# Patient Record
Sex: FEMALE | Race: WHITE | NOT HISPANIC OR LATINO | Employment: FULL TIME | URBAN - METROPOLITAN AREA
[De-identification: names, ages, dates, MRNs, and addresses within clinical notes are randomized per-mention and may not be internally consistent; named-entity substitution may affect disease eponyms.]

---

## 2021-12-02 ENCOUNTER — MEDICAL CORRESPONDENCE (OUTPATIENT)
Dept: HEALTH INFORMATION MANAGEMENT | Facility: CLINIC | Age: 56
End: 2021-12-02
Payer: COMMERCIAL

## 2021-12-02 ENCOUNTER — TRANSCRIBE ORDERS (OUTPATIENT)
Dept: OTHER | Age: 56
End: 2021-12-02

## 2021-12-02 ENCOUNTER — TRANSFERRED RECORDS (OUTPATIENT)
Dept: HEALTH INFORMATION MANAGEMENT | Facility: CLINIC | Age: 56
End: 2021-12-02
Payer: COMMERCIAL

## 2021-12-02 DIAGNOSIS — H02.831 DERMATOCHALASIS OF RIGHT UPPER EYELID: Primary | ICD-10-CM

## 2022-01-26 ENCOUNTER — TELEPHONE (OUTPATIENT)
Dept: OPHTHALMOLOGY | Facility: CLINIC | Age: 57
End: 2022-01-26

## 2022-01-26 ENCOUNTER — OFFICE VISIT (OUTPATIENT)
Dept: OPHTHALMOLOGY | Facility: CLINIC | Age: 57
End: 2022-01-26
Attending: OPHTHALMOLOGY
Payer: COMMERCIAL

## 2022-01-26 DIAGNOSIS — H02.831 DERMATOCHALASIS OF BOTH UPPER EYELIDS: Primary | ICD-10-CM

## 2022-01-26 DIAGNOSIS — H02.834 DERMATOCHALASIS OF BOTH UPPER EYELIDS: Primary | ICD-10-CM

## 2022-01-26 PROCEDURE — 99204 OFFICE O/P NEW MOD 45 MIN: CPT | Mod: GC | Performed by: OPHTHALMOLOGY

## 2022-01-26 RX ORDER — PREDNISONE 20 MG/1
20 TABLET ORAL PRN
COMMUNITY
Start: 2022-01-05 | End: 2023-06-07

## 2022-01-26 ASSESSMENT — MARGIN REFLEX DISTANCE
OD_MRD1: 2.5
OS_MRD1: 2.5

## 2022-01-26 ASSESSMENT — VISUAL ACUITY
OS_CC+: -2
OS_CC: 20/15
OD_CC: 20/15
OD_CC+: -2
CORRECTION_TYPE: GLASSES
METHOD: SNELLEN - LINEAR

## 2022-01-26 ASSESSMENT — TONOMETRY
IOP_METHOD: ICARE
OS_IOP_MMHG: 12
OD_IOP_MMHG: 12

## 2022-01-26 ASSESSMENT — EXTERNAL EXAM - LEFT EYE: OS_EXAM: NORMAL

## 2022-01-26 ASSESSMENT — CONF VISUAL FIELD: COMMENTS: TANGENT VISUAL FIELD PERFORMED TODAY.

## 2022-01-26 ASSESSMENT — EXTERNAL EXAM - RIGHT EYE: OD_EXAM: NORMAL

## 2022-01-26 NOTE — TELEPHONE ENCOUNTER
[facility] Call Center    Phone Message    May a detailed message be left on voicemail: yes     Reason for Call: Other: pt wondering how much she is to take of her prescription, please advise with her     Action Taken: Message routed to:  Adult Clinics: Eye p [id number]    Travel Screening: Not Applicable

## 2022-01-26 NOTE — NURSING NOTE
Chief Complaints and History of Present Illnesses   Patient presents with     Droopy Eye Lid Evaluation       Chief Complaint(s) and History of Present Illness(es)     Droopy Eye Lid Evaluation     Laterality: right upper lid and left upper lid              Comments     Patient referred by Dr. Hays for dermatochalasis consultation. Pt notes lids are dropping and in the way of her vision. Lids will swell off and on and she feels like this has stretched her lids out and made them sag down. Unsure why lids droop, has seen an allergist and homeopathic doctors but has no answers at this point.                 Hermelinda Burris, COA

## 2022-01-26 NOTE — TELEPHONE ENCOUNTER
Checked with Krish Worrell, resident who worked with patient today, verified that there was no medication prescribed by us today and any discussion regarding Predisone dose should be done with whoever prescribed it originally. Relayed message to patient, she will contact her primary care provider.     Hermelinda Burris, COA, 9:46 AM 01/26/2022

## 2022-01-26 NOTE — LETTER
" 2022         RE:  :  MRN: Keyana Galss  1965  [id number]     Dear Dr. Hays,    Thank you for asking me to see your patient, Keyana Glass, for an oculoplastic   consultation.  My assessment and plan are below.  For further details, please see my attached clinic note.           HPI:     Chief Complaint(s) and History of Present Illness(es)     Droopy Eye Lid Evaluation     Laterality: right upper lid and left upper lid         Comments     Patient referred by Dr. Hays for dermatochalasis consultation. Pt   notes lids are dropping and in the way of her vision. Lids will swell off   and on and she feels like this has stretched her lids out and made them   sag down. Unsure why lids droop, has seen an allergist and homeopathic   doctors but has no answers at this point.          Keyana Glass is a 56 year old female who has noted extra skin over both eyelids since she started having repeated episodes of bilateral eyelid swelling starting around 1-2 years ago. She has had intermittent swelling of eyelids for this time and has seen dermatology and allergy doctors to try and discover the etiology of this, states no direct cause has been identified. When her eyelids swell, they stay swollen until she takes oral prednisone for 2 days which resolves the swelling. There is significant itching in the beginning when it will come on. No other treatment has been helpful. She has a small amount of itching initially when the swelling starts. No pain \"unless they are overly swollen with fluid.\" No other sinus/throat/face symptoms. No vision changes but the eyelids feel heavy.     Since this issue started, she feels that the swelling has caused \"loose, saggy excess skin\" above the eyelids that didn't used to be there.      The patient denies double vision. The lid droopy is interfering with activities of daily living.     Last flare was two days ago and she took 40 mg of prednisone, 20 mg yesterday and 10 mg " today.     No makeup. Has tattoo eyeliner. Only topical is Vanicream. Has tried Protopic and steroid antibiotic creams and she feels that made it worse.     EXAM:     MRD1: 2.5/2.5  PFH: 9/9  Dermatochalasis with excess skin, no active lid edema.   Aponeurotic ptosis    VISUAL FIELD:  Right eye untaped:35 degrees Right eye taped:50 degrees  Left eye untaped:35 degrees Left eye taped:50 degrees    Prior workup:  Anti-nuclear antibody, alpha gal IgE, complete blood count (CBC), TSH and Vitamin D were normal.    Assessment & Plan     Keyana Glass is a 56 year old female with the following diagnoses:     Encounter Diagnosis   Name Primary?     Dermatochalasis of both upper eyelids Yes   - Intermittent eyelid swelling of unknown etiology    Intermittent eyelid swelling only responsive to oral prednisone. Continues to be an issue with episodes of swelling that are resolved after taking prednisone.     PLAN:  - Avoid surgery while she continues to have episodes of swelling.   - Has appointment with allergist in the coming weeks, and states she may also see dermatology at [facility]. Recommend re-evaluation in oculoplastics following long term improvement in swelling.    - I will discuss with Dr. Reyes of dermatology to see if would be best to arrange patch testing first or evaluation with him. Given bilateral, I suspect this is allergy or autoimmune in nature. Once this is adequately evaluated and managed can consider blepharoplasty and ptosis repair.     We also discussed with her repeat use of prednisone, she should see her primary care physician.    ANTICOAGULATION:  None    PHOTOS DEMONSTRATE:  Significant dermatochalasis with lids resting on eyelashes and obstructing visual axis      Krish Worrell MD  Resident Physician, PGY-2  Ophthalmology      Attending Physician Attestation: Complete documentation of historical and exam elements from today's encounter can be found in the full encounter summary report (not  reduplicated in this progress note). I personally obtained the chief complaint(s) and history of present illness. I confirmed and edited as necessary the review of systems, past medical/surgical history, family history, social history, and examination findings as documented by others; and I examined the patient myself. I personally reviewed the relevant tests, images, and reports as documented above. I formulated and edited as necessary the assessment and plan and discussed the findings and management plan with the patient. Samuel Osorio MD                Again, thank you for allowing me to participate in the care of your patient.      Sincerely,    Samuel Osorio MD  Department of Ophthalmology and Visual Neurosciences  [facility]    CC: John Duncan MD  [address] [address]  Via Fax: [phone]     Jose Hays MD  [facility]hthal[facility]  [address]  [facility] [address]  Via In Basket

## 2022-01-26 NOTE — PROGRESS NOTES
"Oculoplastic Clinic New Patient    Patient: Keyana Glass MRN# [id number]   YOB: 1965 Age: 56 year old   Date of Visit: Jan 26, 2022    CC: Droopy eyelids obstructing vision.              HPI:     Chief Complaint(s) and History of Present Illness(es)     Droopy Eye Lid Evaluation     Laterality: right upper lid and left upper lid         Comments     Patient referred by Dr. Hays for dermatochalasis consultation. Pt   notes lids are dropping and in the way of her vision. Lids will swell off   and on and she feels like this has stretched her lids out and made them   sag down. Unsure why lids droop, has seen an allergist and homeopathic   doctors but has no answers at this point.          Keyana Glass is a 56 year old female who has noted extra skin over both eyelids since she started having repeated episodes of bilateral eyelid swelling starting around 1-2 years ago. She has had intermittent swelling of eyelids for this time and has seen dermatology and allergy doctors to try and discover the etiology of this, states no direct cause has been identified. When her eyelids swell, they stay swollen until she takes oral prednisone for 2 days which resolves the swelling. There is significant itching in the beginning when it will come on. No other treatment has been helpful. She has a small amount of itching initially when the swelling starts. No pain \"unless they are overly swollen with fluid.\" No other sinus/throat/face symptoms. No vision changes but the eyelids feel heavy.     Since this issue started, she feels that the swelling has caused \"loose, saggy excess skin\" above the eyelids that didn't used to be there.      The patient denies double vision. The lid droopy is interfering with activities of daily living.     Last flare was two days ago and she took 40 mg of prednisone, 20 mg yesterday and 10 mg today.     No makeup. Has tattoo eyeliner. Only topical is Vanicream. Has tried Protopic and " steroid antibiotic creams and she feels that made it worse.     EXAM:     MRD1: 2.5/2.5  PFH: 9/9  Dermatochalasis with excess skin, no active lid edema.   Aponeurotic ptosis    VISUAL FIELD:  Right eye untaped:35 degrees Right eye taped:50 degrees  Left eye untaped:35 degrees Left eye taped:50 degrees    Prior workup:  Anti-nuclear antibody, alpha gal IgE, complete blood count (CBC), TSH and Vitamin D were normal.    Assessment & Plan     Keyana Glass is a 56 year old female with the following diagnoses:     Encounter Diagnosis   Name Primary?     Dermatochalasis of both upper eyelids Yes   - Intermittent eyelid swelling of unknown etiology    Intermittent eyelid swelling only responsive to oral prednisone. Continues to be an issue with episodes of swelling that are resolved after taking prednisone.     PLAN:  - Avoid surgery while she continues to have episodes of swelling.   - Has appointment with allergist in the coming weeks, and states she may also see dermatology at [facility]. Recommend re-evaluation in oculoplastics following long term improvement in swelling.    - I will discuss with Dr. Reyes of dermatology to see if would be best to arrange patch testing first or evaluation with him. Given bilateral, I suspect this is allergy or autoimmune in nature. Once this is adequately evaluated and managed can consider blepharoplasty and ptosis repair.     We also discussed with her repeat use of prednisone, she should see her primary care physician.    ANTICOAGULATION:  None    PHOTOS DEMONSTRATE:  Significant dermatochalasis with lids resting on eyelashes and obstructing visual axis      Krish Wrorell MD  Resident Physician, PGY-2  Ophthalmology      Attending Physician Attestation: Complete documentation of historical and exam elements from today's encounter can be found in the full encounter summary report (not reduplicated in this progress note). I personally obtained the chief complaint(s) and history of  present illness. I confirmed and edited as necessary the review of systems, past medical/surgical history, family history, social history, and examination findings as documented by others; and I examined the patient myself. I personally reviewed the relevant tests, images, and reports as documented above. I formulated and edited as necessary the assessment and plan and discussed the findings and management plan with the patient. Samuel Osorio MD

## 2022-02-01 ENCOUNTER — TELEPHONE (OUTPATIENT)
Dept: DERMATOLOGY | Facility: CLINIC | Age: 57
End: 2022-02-01
Payer: COMMERCIAL

## 2022-02-01 NOTE — TELEPHONE ENCOUNTER
----- Message from Gamal Reyes MD sent at 1/26/2022  2:03 PM CST -----  Rhys Baker,    Happy to see her - I can help coordinate getting her in for patch testing as well.    Kayrene - can you contact the patient and get her scheduled in one of my autoimmune slots?    Thanks!Willy  ----- Message -----  From: Samuel Osorio MD  Sent: 1/26/2022   8:31 AM CST  To: MD Akua Arenas,     I have a nice gal with recurrent flares of eyelid swelling that respond well to prednisone 40 mg for about 2 days. I suspect she needs patch testing, and if negative probably needs some sort of autoimmune workup. In the past Cyndi Gonzalez has told me you are the chris for autoimmune derm. Should I send her to you or another route for patch testing first?     Thanks! Samuel

## 2022-06-22 ENCOUNTER — OFFICE VISIT (OUTPATIENT)
Dept: OPHTHALMOLOGY | Facility: CLINIC | Age: 57
End: 2022-06-22
Payer: COMMERCIAL

## 2022-06-22 DIAGNOSIS — H02.403 INVOLUTIONAL PTOSIS, ACQUIRED, BILATERAL: ICD-10-CM

## 2022-06-22 DIAGNOSIS — H02.831 DERMATOCHALASIS OF BOTH UPPER EYELIDS: Primary | ICD-10-CM

## 2022-06-22 DIAGNOSIS — H02.834 DERMATOCHALASIS OF BOTH UPPER EYELIDS: Primary | ICD-10-CM

## 2022-06-22 PROCEDURE — 99214 OFFICE O/P EST MOD 30 MIN: CPT | Performed by: OPHTHALMOLOGY

## 2022-06-22 ASSESSMENT — TONOMETRY
IOP_METHOD: ICARE
OS_IOP_MMHG: 10
OD_IOP_MMHG: 10

## 2022-06-22 ASSESSMENT — VISUAL ACUITY
OS_CC: 20/20
OD_CC: 20/20
METHOD: SNELLEN - LINEAR
CORRECTION_TYPE: GLASSES
OD_CC+: -1
OS_CC+: -2

## 2022-06-22 ASSESSMENT — SLIT LAMP EXAM - LIDS
COMMENTS: HEAVY DERMATOCHALASIS RESTING ON LASHES, TRUE PTOSIS
COMMENTS: HEAVY DERMATOCHALASIS RESTING ON LASHES, TRUE PTOSIS

## 2022-06-22 NOTE — NURSING NOTE
Chief Complaints and History of Present Illnesses   Patient presents with     Droopy Eye Lid Follow-Up       Chief Complaint(s) and History of Present Illness(es)     Droopy Eye Lid Follow-Up     Laterality: right upper lid and left upper lid              Comments     Patient here for reevaluation of droopy lids. She had been having issues with swelling and narrowed it down to allergies. Has been only using baby shampoo to wash around her eyes ands has had no issues of swelling since February 2022.   Eye lids continue to droop and interfere with her vision. States that her lids feel heavy all the time.                 Clive Alvarez, Ophthalmic Assistant

## 2022-06-22 NOTE — PROGRESS NOTES
Oculoplastic Clinic Patient    Patient: Keyana Glass MRN# [id number]   YOB: 1965 Age: 57 year old   Date of Visit: Jun 22, 2022    CC: Droopy eyelids obstructing vision.              HPI:     Chief Complaint(s) and History of Present Illness(es)     Droopy Eye Lid Follow-Up     Laterality: right upper lid and left upper lid              Comments     Patient here for reevaluation of droopy lids. She had been having issues   with swelling and narrowed it down to allergies. Has been only using baby   shampoo to wash around her eyes ands has had no issues of swelling since   February 2022.   Eye lids continue to droop and interfere with her vision. States that her   lids feel heavy all the time.        Patch testing was completed at [facility] with Dr. Barber. She reports nothing was found but she stopped all makeup and swelling resolved.     Keyana Glass is a 57 year old female who has noted gradual onset of droopy eyelids over the past years. The droopy eyelid is interfering with activities of daily living including driving, and reading. The patient denies double vision, variability of the eyelid position, or dry eye symptoms.     EXAM:     MRD1: 0 mm both eyes   Dermatochalasis with excess skin touching eyelashes   Aponeurotic ptosis   Brow ptosis with brow resting below superior orbital rim on the left     VISUAL FIELD (from visit dated 1/26/2022)  Right eye untaped:35 degrees Right eye taped:50 degrees  Left eye untaped:35 degrees Left eye taped:50 degrees    Assessment & Plan     Keyana Glass is a 57 year old female with the following diagnoses:   1. Dermatochalasis of both upper eyelids    2. Involutional ptosis, acquired, bilateral       Both upper eyelid ptosis repair 78811 (skin levator, nf)    ANTICOAGULATION:  Occasional mvi or fishoil        PHOTOS DEMONSTRATE:    Significant dermatochalasis with lids resting on eyelashes and obstructing visual axis  Blepharoptosis  Left brow ptosis    Attending Physician Attestation: Complete documentation of historical and exam elements from today's encounter can be found in the full encounter summary report (not reduplicated in this progress note). I personally obtained the chief complaint(s) and history of present illness. I confirmed and edited as necessary the review of systems, past medical/surgical history, family history, social history, and examination findings as documented by others; and I examined the patient myself. I personally reviewed the relevant tests, images, and reports as documented above. I formulated and edited as necessary the assessment and plan and discussed the findings and management plan with the patient. Samuel Osorio MD      Today with Keyana Glass I reviewed the indications, risks, benefits, and alternatives of the proposed surgical procedure including, but not limited to, failure obtain the desired result  and need for additional surgery, bleeding, infection, loss of vision, loss of the eye, and the remote possibility of permanent damage to any organ system or death with the use of anesthesia.  I provided multiple opportunities for the questions, answered all questions to the best of my ability, and confirmed that my answers and my discussion were understood.

## 2022-06-23 PROBLEM — H02.403 INVOLUTIONAL PTOSIS, ACQUIRED, BILATERAL: Status: ACTIVE | Noted: 2022-06-22

## 2022-06-23 PROBLEM — H02.834 DERMATOCHALASIS OF BOTH UPPER EYELIDS: Status: ACTIVE | Noted: 2022-06-22

## 2022-06-23 PROBLEM — H02.831 DERMATOCHALASIS OF BOTH UPPER EYELIDS: Status: ACTIVE | Noted: 2022-06-22

## 2022-07-22 ENCOUNTER — TELEPHONE (OUTPATIENT)
Dept: OPHTHALMOLOGY | Facility: CLINIC | Age: 57
End: 2022-07-22

## 2022-07-22 NOTE — TELEPHONE ENCOUNTER
[facility] Call Center    Phone Message    May a detailed message be left on voicemail: yes     Reason for Call: Pt is requesting a call back to let her know what the status is on prior authorization for her surgery.  She said that she has called 3 times and no one is returning her call.  If she doesn't answer, she wants a very detailed message left as to the exact status of the prior authorization.    Pt also said that lost the packet of information that we sent her and she wants a new packet mailed ASAP.  Thanks.    Action Taken: Message routed to:  Adult Clinics: Eye p [id number]    Travel Screening: Not Applicable

## 2022-07-25 NOTE — TELEPHONE ENCOUNTER
Left a detailed message informing patient that the procedures go into a work queue and start the prior authorization process about a month ahead of the scheduled date of the procedure. I did indicate that because this procedure is in February 2023 that we will begin the prior authorization in January of 2023 to ensure that we pursue a prior authorization approval with the most up-to-date and active insurance.    Left my phone number for any further question or concerns,          RUBEN Vidal  Financial Counselor  [facility]  [address] [place] [address]  [phone]

## 2022-11-17 ENCOUNTER — DOCUMENTATION ONLY (OUTPATIENT)
Dept: OPHTHALMOLOGY | Facility: CLINIC | Age: 57
End: 2022-11-17

## 2022-11-17 NOTE — PROGRESS NOTES
Received staff message from [place]  stating patient called and [facility] stating they misplaced their surgery packet that was given in clinic when scheduled and asked   to send a new packet. . Mailing new surgery packet.

## 2022-11-29 ENCOUNTER — TELEPHONE (OUTPATIENT)
Dept: OPHTHALMOLOGY | Facility: CLINIC | Age: 57
End: 2022-11-29

## 2022-11-29 NOTE — TELEPHONE ENCOUNTER
Spoke with the patient to reschedule surgery with Dr. Osorio. I told her that her office visit was from 6/22/22, so we will need her to come back in for a new consult if she doesn't have surgery before that time. She understood, but needs to wait until the end of August so she can coordinate with her daughter coming into town.    Date Scheduled: 8-28-23  Facility: [facility]  Surgeon: Dr. Osorio   Post-op appointment scheduled:    scheduled?: No  Surgery packet/instructions confirmed received?  Yes  Special Considerations:     Patient is scheduled to come back into the office on 6/7/23 to confirm surgery information and update chart for insurance purposes.    Shanae Ghosh, Surgery Scheduling Coordinator

## 2022-11-29 NOTE — TELEPHONE ENCOUNTER
Patient called in and LVM asking to reschedule surgery to June 2023. Called patient and LVM to reschedule surgery. Provided call back number [phone].

## 2023-02-11 ENCOUNTER — HEALTH MAINTENANCE LETTER (OUTPATIENT)
Age: 58
End: 2023-02-11

## 2023-06-07 ENCOUNTER — OFFICE VISIT (OUTPATIENT)
Dept: OPHTHALMOLOGY | Facility: CLINIC | Age: 58
End: 2023-06-07
Payer: COMMERCIAL

## 2023-06-07 DIAGNOSIS — H02.403 INVOLUTIONAL PTOSIS, ACQUIRED, BILATERAL: ICD-10-CM

## 2023-06-07 DIAGNOSIS — H02.834 DERMATOCHALASIS OF BOTH UPPER EYELIDS: Primary | ICD-10-CM

## 2023-06-07 DIAGNOSIS — H02.831 DERMATOCHALASIS OF BOTH UPPER EYELIDS: Primary | ICD-10-CM

## 2023-06-07 PROCEDURE — 92285 EXTERNAL OCULAR PHOTOGRAPHY: CPT | Performed by: OPHTHALMOLOGY

## 2023-06-07 PROCEDURE — 99214 OFFICE O/P EST MOD 30 MIN: CPT | Performed by: OPHTHALMOLOGY

## 2023-06-07 PROCEDURE — 92081 LIMITED VISUAL FIELD XM: CPT | Performed by: OPHTHALMOLOGY

## 2023-06-07 ASSESSMENT — VISUAL ACUITY
OS_CC+: -2
METHOD: SNELLEN - LINEAR
OS_CC: 20/25
OD_CC+: -2
CORRECTION_TYPE: GLASSES
OD_CC: 20/20

## 2023-06-07 ASSESSMENT — TONOMETRY
IOP_METHOD: ICARE
OD_IOP_MMHG: 10
OS_IOP_MMHG: 9

## 2023-06-07 ASSESSMENT — CONF VISUAL FIELD: COMMENTS: TANGENT VISUAL FIELD PERFORMED TODAY.

## 2023-06-07 NOTE — NURSING NOTE
Chief Complaints and History of Present Illnesses   Patient presents with     Droopy Eye Lid Evaluation       Chief Complaint(s) and History of Present Illness(es)     Droopy Eye Lid Evaluation            Laterality: right upper lid and left upper lid          Comments    Patient here for repeat testing prior to scheduled bilateral ptosis repair on 8/28/23. Feels lids are about the same as they were last year. Lids droop down and interfere with her vision. Lids feel heavy.                  Hermelinda Burris, COT

## 2023-06-07 NOTE — PROGRESS NOTES
Oculoplastic Clinic Return Patient    Patient: Keyana Glass MRN# [id number]   YOB: 1965 Age: 58 year old   Date of Visit: Jun 7, 2023    CC: Droopy eyelids obstructing vision.              HPI:     Chief Complaint(s) and History of Present Illness(es)     Droopy Eye Lid Evaluation            Laterality: right upper lid and left upper lid          Comments    Patient here for repeat testing prior to scheduled bilateral ptosis repair   on 8/28/23. Feels lids are about the same as they were last year. Lids   droop down and interfere with her vision. Lids feel heavy.     Of note had recurrent episodes of lid edema for several years. Had patch testing and another allergy test. Ultimately found that when she stopped wearing mascara she no longer had episodes of lid edema.  She has not had lid edema since maybe October 2022.     Keyana Glass is a 58 year old female who has noted gradual onset of droopy eyelids over the past years. The droopy eyelid is interfering with activities of daily living including driving, and reading. The patient denies double vision, variability of the eyelid position, or dry eye symptoms.     EXAM:     MRD1: 0 mm both eyes   Dermatochalasis with excess skin touching eyelashes and aponeurotic ptosis       VISUAL FIELD:  Right eye untaped:32 degrees Right eye taped:50 degrees  Left eye untaped:32 degrees Left eye taped:50 degrees    Assessment & Plan     Keyana Glass is a 58 year old female with the following diagnoses:   1. Dermatochalasis of both upper eyelids    2. Involutional ptosis, acquired, bilateral         Bilateral upper eyelid ptosis repair external levator approach (s, levator check intraop). [id number]     Pointed out brow asymmetry.      ANTICOAGULATION:  none        PHOTOS DEMONSTRATE:    Significant dermatochalasis with lids resting on eyelashes and obstructing visual axis  Blepharoptosis    Attending Physician Attestation: Complete documentation of historical  and exam elements from today's encounter can be found in the full encounter summary report (not reduplicated in this progress note). I personally obtained the chief complaint(s) and history of present illness. I confirmed and edited as necessary the review of systems, past medical/surgical history, family history, social history, and examination findings as documented by others; and I examined the patient myself. I personally reviewed the relevant tests, images, and reports as documented above. I formulated and edited as necessary the assessment and plan and discussed the findings and management plan with the patient. Samuel Osorio MD      Today with Keyana Glass I reviewed the indications, risks, benefits, and alternatives of the proposed surgical procedure including, but not limited to, failure obtain the desired result  and need for additional surgery, bleeding, infection, loss of vision, or injury to the eye, dry eyes, and the remote possibility of permanent damage to any organ system or death with the use of anesthesia.  I provided multiple opportunities for the questions, answered all questions to the best of my ability, and confirmed that my answers and my discussion were understood.

## 2023-08-24 ENCOUNTER — TELEPHONE (OUTPATIENT)
Dept: OPHTHALMOLOGY | Facility: CLINIC | Age: 58
End: 2023-08-24
Payer: COMMERCIAL

## 2023-08-24 ENCOUNTER — TRANSFERRED RECORDS (OUTPATIENT)
Dept: HEALTH INFORMATION MANAGEMENT | Facility: CLINIC | Age: 58
End: 2023-08-24
Payer: COMMERCIAL

## 2023-08-24 NOTE — TELEPHONE ENCOUNTER
Call did not come from clinic, [facility] called to discuss pts upcoming surgery arrival time, instructions, etc. They will call pt back.     Hermelinda Burris, COT, 1:42 PM 08/24/2023

## 2023-08-24 NOTE — TELEPHONE ENCOUNTER
[facility] Call Center    Phone Message    May a detailed message be left on voicemail: yes     Reason for Call: Other: Patient stated she missed a call from the clinic. Patient would like to know if the pre op form was received.   Please call patient to discuss. Thanks!      Action Taken: Other: Eye MG     Travel Screening: Not Applicable

## 2023-08-25 ENCOUNTER — ANESTHESIA EVENT (OUTPATIENT)
Dept: SURGERY | Facility: AMBULATORY SURGERY CENTER | Age: 58
End: 2023-08-25
Payer: COMMERCIAL

## 2023-08-28 ENCOUNTER — ANESTHESIA (OUTPATIENT)
Dept: SURGERY | Facility: AMBULATORY SURGERY CENTER | Age: 58
End: 2023-08-28
Payer: COMMERCIAL

## 2023-08-28 ENCOUNTER — HOSPITAL ENCOUNTER (OUTPATIENT)
Facility: AMBULATORY SURGERY CENTER | Age: 58
Discharge: HOME | End: 2023-08-28
Attending: OPHTHALMOLOGY | Admitting: OPHTHALMOLOGY
Payer: COMMERCIAL

## 2023-08-28 VITALS
BODY MASS INDEX: 24.43 KG/M2 | TEMPERATURE: 97.6 F | HEIGHT: 67 IN | DIASTOLIC BLOOD PRESSURE: 81 MMHG | RESPIRATION RATE: 16 BRPM | WEIGHT: 155.65 LBS | OXYGEN SATURATION: 98 % | HEART RATE: 51 BPM | SYSTOLIC BLOOD PRESSURE: 122 MMHG

## 2023-08-28 DIAGNOSIS — H02.403 INVOLUTIONAL PTOSIS, ACQUIRED, BILATERAL: Primary | ICD-10-CM

## 2023-08-28 PROCEDURE — 67904 REPAIR EYELID DEFECT: CPT | Mod: 50

## 2023-08-28 PROCEDURE — 67904 REPAIR EYELID DEFECT: CPT | Mod: 50 | Performed by: OPHTHALMOLOGY

## 2023-08-28 PROCEDURE — G8918 PT W/O PREOP ORDER IV AB PRO: HCPCS

## 2023-08-28 PROCEDURE — G8907 PT DOC NO EVENTS ON DISCHARG: HCPCS

## 2023-08-28 RX ORDER — OXYCODONE HYDROCHLORIDE 5 MG/1
10 TABLET ORAL
Status: DISCONTINUED | OUTPATIENT
Start: 2023-08-28 | End: 2023-08-29 | Stop reason: HOSPADM

## 2023-08-28 RX ORDER — ERYTHROMYCIN 5 MG/G
0.5 OINTMENT OPHTHALMIC 3 TIMES DAILY
Qty: 3.5 G | Refills: 3 | Status: SHIPPED | OUTPATIENT
Start: 2023-08-28 | End: 2023-09-04

## 2023-08-28 RX ORDER — ERYTHROMYCIN 5 MG/G
OINTMENT OPHTHALMIC PRN
Status: DISCONTINUED | OUTPATIENT
Start: 2023-08-28 | End: 2023-08-28 | Stop reason: HOSPADM

## 2023-08-28 RX ORDER — FENTANYL CITRATE 50 UG/ML
25 INJECTION, SOLUTION INTRAMUSCULAR; INTRAVENOUS EVERY 5 MIN PRN
Status: DISCONTINUED | OUTPATIENT
Start: 2023-08-28 | End: 2023-08-29 | Stop reason: HOSPADM

## 2023-08-28 RX ORDER — ONDANSETRON 2 MG/ML
4 INJECTION INTRAMUSCULAR; INTRAVENOUS EVERY 30 MIN PRN
Status: DISCONTINUED | OUTPATIENT
Start: 2023-08-28 | End: 2023-08-29 | Stop reason: HOSPADM

## 2023-08-28 RX ORDER — FENTANYL CITRATE 50 UG/ML
50 INJECTION, SOLUTION INTRAMUSCULAR; INTRAVENOUS EVERY 5 MIN PRN
Status: DISCONTINUED | OUTPATIENT
Start: 2023-08-28 | End: 2023-08-29 | Stop reason: HOSPADM

## 2023-08-28 RX ORDER — LIDOCAINE 40 MG/G
CREAM TOPICAL
Status: DISCONTINUED | OUTPATIENT
Start: 2023-08-28 | End: 2023-08-29 | Stop reason: HOSPADM

## 2023-08-28 RX ORDER — SODIUM CHLORIDE, SODIUM LACTATE, POTASSIUM CHLORIDE, CALCIUM CHLORIDE 600; 310; 30; 20 MG/100ML; MG/100ML; MG/100ML; MG/100ML
INJECTION, SOLUTION INTRAVENOUS CONTINUOUS
Status: DISCONTINUED | OUTPATIENT
Start: 2023-08-28 | End: 2023-08-29 | Stop reason: HOSPADM

## 2023-08-28 RX ORDER — TETRACAINE HYDROCHLORIDE 5 MG/ML
SOLUTION OPHTHALMIC PRN
Status: DISCONTINUED | OUTPATIENT
Start: 2023-08-28 | End: 2023-08-28 | Stop reason: HOSPADM

## 2023-08-28 RX ORDER — OXYCODONE HYDROCHLORIDE 5 MG/1
5 TABLET ORAL
Status: DISCONTINUED | OUTPATIENT
Start: 2023-08-28 | End: 2023-08-29 | Stop reason: HOSPADM

## 2023-08-28 RX ORDER — ONDANSETRON 4 MG/1
4 TABLET, ORALLY DISINTEGRATING ORAL EVERY 30 MIN PRN
Status: DISCONTINUED | OUTPATIENT
Start: 2023-08-28 | End: 2023-08-29 | Stop reason: HOSPADM

## 2023-08-28 RX ORDER — ACETAMINOPHEN 325 MG/1
975 TABLET ORAL ONCE
Status: COMPLETED | OUTPATIENT
Start: 2023-08-28 | End: 2023-08-28

## 2023-08-28 RX ADMIN — SODIUM CHLORIDE, SODIUM LACTATE, POTASSIUM CHLORIDE, CALCIUM CHLORIDE: 600; 310; 30; 20 INJECTION, SOLUTION INTRAVENOUS at 08:45

## 2023-08-28 RX ADMIN — ACETAMINOPHEN 975 MG: 325 TABLET ORAL at 08:42

## 2023-08-28 NOTE — OP NOTE
PREOPERATIVE DIAGNOSIS: Ptosis, bilateral upper lid.   POSTOPERATIVE DIAGNOSIS:  Ptosis,bilateral upper lid.   PROCEDURE: Bilateral  upper eyelid  ptosis repair by external levator resection.   SURGEON: Samuel Osorio MD  ASSISTANT: Shelby Quintana MD and Mariusz Apodaca MD  ANESTHESIA: Local infiltration of a 50/50 mixture of 2% lidocaine with epinephrine and 0.5% Marcaine.   COMPLICATIONS: None.   ESTIMATED BLOOD LOSS: Less than 5 mL.   HISTORY: Keyana Glass  presented with ptosis of bilateral upper lid interfering with the superior visual field and activities of daily living. After the risks, benefits and alternatives to the proposed procedure were explained, informed consent was obtained.   DESCRIPTION OF PROCEDURE: Keyana Glass  was brought to the operating room and placed supine on the operating table. Under local anesthesia, the bilateral upper lid crease and an ellipse of skin was marked with a marking pen and infiltrated with local anesthetic. The area was prepped and draped in the typical sterile ophthalmic fashion. Attention was directed to the right  side. The previously marked ellipse was incised with a 15 blade and the skin flap was excised with high temperature cautery. The orbital septum was opened horizontally. Nasal and central fat was conservatively debulked. The levator aponeurosis was identified and dissected from the superior tarsal border and the underlying Jones's muscle and advanced with a 6-0 Vicryl suture to bring the lid into a normal height and contour. The patient was asked to open her eye and the suture adjusted for height and contour. Attention was directed to the other side where the same procedure was performed. The skin was closed with with running 6-0 plain gut sutures.   The patient tolerated the procedure well and left the operating room in stable condition.     Samuel Osorio MD

## 2023-08-28 NOTE — ANESTHESIA PREPROCEDURE EVALUATION
Anesthesia Pre-Procedure Evaluation    Patient: Keyana Glass   MRN: [id number] : 1965        Procedure : Procedure(s):  Both upper eyelid ptosis repair          No past medical history on file.   History reviewed. No pertinent surgical history.   No Known Allergies   Social History     Tobacco Use     Smoking status: Never     Smokeless tobacco: Never   Substance Use Topics     Alcohol use: Not on file      Wt Readings from Last 1 Encounters:   23 70.6 kg (155 lb 10.3 oz)        Anesthesia Evaluation            ROS/MED HX  ENT/Pulmonary:       Neurologic: Comment: Bilateral carpal tunnel       Cardiovascular:       METS/Exercise Tolerance:     Hematologic:       Musculoskeletal:       GI/Hepatic:       Renal/Genitourinary:       Endo:       Psychiatric/Substance Use:       Infectious Disease:       Malignancy:       Other:               OUTSIDE LABS:  CBC: No results found for: WBC, HGB, HCT, PLT  BMP: No results found for: NA, POTASSIUM, CHLORIDE, CO2, BUN, CR, GLC  COAGS: No results found for: PTT, INR, FIBR  POC: No results found for: BGM, HCG, HCGS  HEPATIC: No results found for: ALBUMIN, PROTTOTAL, ALT, AST, GGT, ALKPHOS, BILITOTAL, BILIDIRECT, KIMMIE  OTHER: No results found for: PH, LACT, A1C, EFRAÍN, PHOS, MAG, LIPASE, AMYLASE, TSH, T4, T3, CRP, SED            Hernan Barahona MD

## 2023-08-28 NOTE — BRIEF OP NOTE
[facility]    Brief Operative Note    Pre-operative diagnosis: Dermatochalasis of both upper eyelids [H02.831, H02.834]  Involutional ptosis, acquired, bilateral [H02.403]  Post-operative diagnosis Same as pre-operative diagnosis    Procedure: Procedure(s):  Both upper eyelid ptosis repair  Surgeon: Surgeon(s) and Role:     * Samuel Osorio MD - Primary  Anesthesia: MAC with Local   Estimated Blood Loss: Minimal    Drains: None  Specimens: * No specimens in log *  Findings:   None.  Complications: None  .  Implants: * No implants in log *

## 2023-08-28 NOTE — DISCHARGE INSTRUCTIONS
Post-operative Instructions  Ophthalmic Plastic and Reconstructive Surgery    Samuel Osorio M.D.   Shelby Quintana M.D.    All instructions apply to the operated eye(s) or eyelid(s).    Wound care and personal care  ? Apply ice compresses and gentle pressure 15 minutes on, 15 minutes off, for 2 days. If you are sleeping, you don't need to wake up to ice. As long as there is no further bleeding, after two days, switch to warm water compresses for five minutes, four times a day until seen by your physician.   ? You may shower or wash your hair the day after surgery. Do not go swimming for at least 2 weeks to prevent contamination of your wounds.  ? You may go for walks and light activity is ok, but no heavy (over 15 pounds) lifting, bending or excessive straining for one week.   ? Do not apply make-up to the eyes or eyelids for 2 weeks after surgery.  ? Expect some swelling, bruising, black eye (even into the lower eyelids and cheeks). Also expect serum caking, crusting and discharge from the eye and/or incisions. A small amount of surface bleeding, and depending on the type of surgery, bleeding from the inside of the eyelid, is normal for the first 48 hours.  ? Avoid straining, bending at the waist, or lifting more than 15 pounds for 1 week. Sleeping with your head elevated, such as in a recliner, for the first several days can help swelling resolve more quickly.   ? Do continue to ambulate (walk) as you normally would - being sedentary after surgery can cause blood clots.   ? Your eye(s) and eyelid(s) may be painful and tender. This is normal after surgery.      Contact information and follow-up  ? Return to the Eye Clinic for a follow-up appointment with your physician as scheduled. If no appointment has been scheduled:   - [facility] eye clinic: [phone] for an appointment with Dr. Osorio within 2 to 3 weeks from your date of surgery.    After hours or on weekends and holidays, call  "[phone] and ask to speak with the ophthalmologist on call.    An on call person can be reached after hours for concerns. The on call doctor should not call in medication refill requests after hours or on weekends, so please plan accordingly. An effort has been made to provide adequate pain medications following every surgery, and refills will not be provided in most instances.     Medications  ? Restart all regular home medications and any prior eye drops. If you take Plavix or Aspirin on a regular basis, wait for 72 hours after your surgery before restarting these in order to decrease the risk of bleeding complications.  ? Avoid aspirin and aspirin-like medications (Motrin, Aleve, Ibuprofen, Susu-Lannon etc) for 72 hours to reduce the risk of bleeding. You may take Tylenol (acetaminophen) for pain.  ? In addition to your home medications, take the following post-operative medications as prescribed by your physician.    ? Apply antibiotic ointment to all sutures three times a day, and into the operated eye(s) at night.   Once you run out, you can apply Vaseline or Aquaphor (over the counter) to the incisions. Don't put the Vaseline or Aquaphor into your eyes.   ? If you have ocular irritation, you can use over the counter artificial tears such as Refresh, Systane, or Blink. Do not use Visine, Clear Eyes, or any other drop that \"gets the red out.\"  ? In many cases, postoperative discomfort can be managed with Tylenol alone. If narcotic pain medication was prescribed, take pain pills at prescribed frequency as needed for pain.    Tylenol 975mg mg was given at 8:45am. Next dose may be given at 2:45pm.    You should not take more then 4,000 mg of tylenol/acetaminophen in a 24 hour period.   "

## 2023-09-21 ENCOUNTER — OFFICE VISIT (OUTPATIENT)
Dept: OPHTHALMOLOGY | Facility: CLINIC | Age: 58
End: 2023-09-21
Payer: COMMERCIAL

## 2023-09-21 DIAGNOSIS — H02.831 DERMATOCHALASIS OF BOTH UPPER EYELIDS: ICD-10-CM

## 2023-09-21 DIAGNOSIS — H02.834 DERMATOCHALASIS OF BOTH UPPER EYELIDS: ICD-10-CM

## 2023-09-21 DIAGNOSIS — H02.403 INVOLUTIONAL PTOSIS, ACQUIRED, BILATERAL: Primary | ICD-10-CM

## 2023-09-21 PROCEDURE — 99024 POSTOP FOLLOW-UP VISIT: CPT | Performed by: OPHTHALMOLOGY

## 2023-09-21 ASSESSMENT — VISUAL ACUITY
METHOD: SNELLEN - LINEAR
CORRECTION_TYPE: GLASSES
OS_CC: 20/25
OD_CC+: -3
OD_CC: 20/20

## 2023-09-21 ASSESSMENT — TONOMETRY
IOP_METHOD: ICARE
OD_IOP_MMHG: 10
OS_IOP_MMHG: 11

## 2023-09-21 NOTE — PROGRESS NOTES
"Chief Complaint(s) and History of Present Illness(es)     Post Op (Ophthalmology) Both Eyes            Laterality: both eyes    Comments: Pt here for POW#3 s/p Both upper eyelid ptosis repair DOS   08/28/2023          Comments    Pt happy with results. States everything is going well.     Simran Lee, COA on 9/21/2023 at 10:27 AM                 Doing well. Happy with outcome.    Patient Instructions   - Apply warm compresses for 1 minute two to three times a day until your bruising has resolved. You can continue this for one more month.   - Apply Aquaphor or Vaseline to the incision at bedtime until it is smooth.    - If you have symptoms of eye irritation, it is good to use over the counter artificial tears. Good brands include Refresh, Blink, and Systane. Do NOT get drops that are for \"red eyes.\"   - It is normal for the incision to appear raised, red, itch and have small lumps. You can gently massage any small bumps along the incision line. These can take up to six months to resolve.    No follow-ups on file.      Attending Physician Attestation: Complete documentation of historical and exam elements from today's encounter can be found in the full encounter summary report (not reduplicated in this progress note). I personally obtained the chief complaint(s) and history of present illness. I confirmed and edited as necessary the review of systems, past medical/surgical history, family history, social history, and examination findings as documented by others; and I examined the patient myself. I personally reviewed the relevant tests, images, and reports as documented above. I formulated and edited as necessary the assessment and plan and discussed the findings and management plan with the patient and family. I personally reviewed the ophthalmic test(s) associated with this encounter, agree with the interpretation(s) as documented by the resident/fellow, and have edited the corresponding report(s) as " necessary. Samuel Osorio MD

## 2023-09-21 NOTE — NURSING NOTE
Chief Complaints and History of Present Illnesses   Patient presents with    Post Op (Ophthalmology) Both Eyes     Pt here for POW#3 s/p Both upper eyelid ptosis repair DOS 08/28/2023     Chief Complaint(s) and History of Present Illness(es)       Post Op (Ophthalmology) Both Eyes              Laterality: both eyes    Comments: Pt here for POW#3 s/p Both upper eyelid ptosis repair DOS 08/28/2023              Comments    Pt happy with results. States everything is going well.     Simran Lee, COA on 9/21/2023 at 10:27 AM

## 2023-09-21 NOTE — PATIENT INSTRUCTIONS
"- Apply warm compresses for 1 minute two to three times a day until your bruising has resolved. You can continue this for one more month.   - Apply Aquaphor or Vaseline to the incision at bedtime until it is smooth.    - If you have symptoms of eye irritation, it is good to use over the counter artificial tears. Good brands include Refresh, Blink, and Systane. Do NOT get drops that are for \"red eyes.\"   - It is normal for the incision to appear raised, red, itch and have small lumps. You can gently massage any small bumps along the incision line. These can take up to six months to resolve.    "

## 2024-03-09 ENCOUNTER — HEALTH MAINTENANCE LETTER (OUTPATIENT)
Age: 59
End: 2024-03-09

## 2025-03-16 ENCOUNTER — HEALTH MAINTENANCE LETTER (OUTPATIENT)
Age: 60
End: 2025-03-16

## 2025-08-31 ENCOUNTER — HEALTH MAINTENANCE LETTER (OUTPATIENT)
Age: 60
End: 2025-08-31

## (undated) DEVICE — SYR 03ML LL W/O NDL

## (undated) DEVICE — NDL 30GA 0.5" 305106

## (undated) DEVICE — PACK MINOR EYE

## (undated) DEVICE — GLOVE BIOGEL PI MICRO SZ 7.5 48575

## (undated) DEVICE — SOL WATER IRRIG 1000ML BOTTLE 07139-09

## (undated) DEVICE — MARKER SKIN DOUBLE TIP W/FLEXI-RULER W/LABELS

## (undated) DEVICE — ESU EYE HIGH TEMP 65410-183

## (undated) DEVICE — ESU ELEC NDL 1" COATED/INSULATED E1465

## (undated) RX ORDER — ACETAMINOPHEN 325 MG/1
TABLET ORAL
Status: DISPENSED
Start: 2023-08-28